# Patient Record
Sex: FEMALE | Race: WHITE | NOT HISPANIC OR LATINO | URBAN - METROPOLITAN AREA
[De-identification: names, ages, dates, MRNs, and addresses within clinical notes are randomized per-mention and may not be internally consistent; named-entity substitution may affect disease eponyms.]

---

## 2024-10-21 ENCOUNTER — OFFICE VISIT (OUTPATIENT)
Dept: LAB | Facility: CLINIC | Age: 29
End: 2024-10-21
Payer: COMMERCIAL

## 2024-10-21 DIAGNOSIS — H04.121 DRY EYE SYNDROME OF RIGHT LACRIMAL GLAND: ICD-10-CM

## 2024-10-21 DIAGNOSIS — R00.2 PALPITATIONS: ICD-10-CM

## 2024-10-21 PROCEDURE — 93005 ELECTROCARDIOGRAM TRACING: CPT

## 2024-10-22 LAB
ATRIAL RATE: 77 BPM
P AXIS: 80 DEGREES
PR INTERVAL: 132 MS
QRS AXIS: 78 DEGREES
QRSD INTERVAL: 90 MS
QT INTERVAL: 376 MS
QTC INTERVAL: 425 MS
T WAVE AXIS: 14 DEGREES
VENTRICULAR RATE: 77 BPM

## 2024-10-22 PROCEDURE — 93010 ELECTROCARDIOGRAM REPORT: CPT | Performed by: INTERNAL MEDICINE

## 2024-11-04 ENCOUNTER — OFFICE VISIT (OUTPATIENT)
Dept: CARDIOLOGY CLINIC | Facility: CLINIC | Age: 29
End: 2024-11-04
Payer: COMMERCIAL

## 2024-11-04 VITALS
DIASTOLIC BLOOD PRESSURE: 62 MMHG | SYSTOLIC BLOOD PRESSURE: 102 MMHG | WEIGHT: 127.2 LBS | BODY MASS INDEX: 20.44 KG/M2 | HEIGHT: 66 IN | HEART RATE: 78 BPM

## 2024-11-04 DIAGNOSIS — R07.9 CHEST PAIN, UNSPECIFIED TYPE: Primary | ICD-10-CM

## 2024-11-04 DIAGNOSIS — Z82.49 FAMILY HISTORY OF CARDIAC DISORDER: ICD-10-CM

## 2024-11-04 PROBLEM — R00.2 PALPITATION: Status: ACTIVE | Noted: 2024-11-04

## 2024-11-04 PROCEDURE — 99203 OFFICE O/P NEW LOW 30 MIN: CPT | Performed by: INTERNAL MEDICINE

## 2024-11-06 ENCOUNTER — HOSPITAL ENCOUNTER (OUTPATIENT)
Dept: NON INVASIVE DIAGNOSTICS | Age: 29
Discharge: HOME | End: 2024-11-06
Payer: COMMERCIAL

## 2024-11-06 VITALS
WEIGHT: 127 LBS | HEART RATE: 67 BPM | DIASTOLIC BLOOD PRESSURE: 68 MMHG | BODY MASS INDEX: 20.41 KG/M2 | SYSTOLIC BLOOD PRESSURE: 108 MMHG | HEIGHT: 66 IN

## 2024-11-06 DIAGNOSIS — Z82.49 FAMILY HISTORY OF CARDIAC DISORDER: ICD-10-CM

## 2024-11-06 DIAGNOSIS — R07.9 CHEST PAIN, UNSPECIFIED TYPE: ICD-10-CM

## 2024-11-06 LAB
AORTIC ROOT: 3 CM
APICAL FOUR CHAMBER EJECTION FRACTION: 59 %
ASCENDING AORTA: 2.8 CM
BSA FOR ECHO PROCEDURE: 1.65 M2
DOP CALC LVOT AREA: 4.15 CM2
DOP CALC LVOT DIAMETER: 2.3 CM
E WAVE DECELERATION TIME: 171 MS
E/A RATIO: 1.49
FRACTIONAL SHORTENING: 32 (ref 28–44)
INTERVENTRICULAR SEPTUM IN DIASTOLE (PARASTERNAL SHORT AXIS VIEW): 0.6 CM
INTERVENTRICULAR SEPTUM: 0.6 CM (ref 0.6–1.1)
LAAS-AP2: 14.1 CM2
LAAS-AP4: 14.8 CM2
LEFT ATRIUM SIZE: 2.5 CM
LEFT ATRIUM VOLUME (MOD BIPLANE): 37 ML
LEFT ATRIUM VOLUME INDEX (MOD BIPLANE): 22.4 ML/M2
LEFT INTERNAL DIMENSION IN SYSTOLE: 3.2 CM (ref 2.1–4)
LEFT VENTRICULAR INTERNAL DIMENSION IN DIASTOLE: 4.7 CM (ref 3.5–6)
LEFT VENTRICULAR POSTERIOR WALL IN END DIASTOLE: 0.8 CM
LEFT VENTRICULAR STROKE VOLUME: 63 ML
LVSV (TEICH): 63 ML
MV E'TISSUE VEL-SEP: 12 CM/S
MV PEAK A VEL: 0.41 M/S
MV PEAK E VEL: 61 CM/S
MV STENOSIS PRESSURE HALF TIME: 50 MS
MV VALVE AREA P 1/2 METHOD: 4.4
RIGHT ATRIUM AREA SYSTOLE A4C: 13 CM2
RIGHT VENTRICLE ID DIMENSION: 2.9 CM
SL CV LEFT ATRIUM LENGTH A2C: 4.3 CM
SL CV LV EF: 60
SL CV PED ECHO LEFT VENTRICLE DIASTOLIC VOLUME (MOD BIPLANE) 2D: 103 ML
SL CV PED ECHO LEFT VENTRICLE SYSTOLIC VOLUME (MOD BIPLANE) 2D: 39 ML
TR MAX PG: 17 MMHG
TR PEAK VELOCITY: 2.1 M/S
TRICUSPID ANNULAR PLANE SYSTOLIC EXCURSION: 2.2 CM
TRICUSPID VALVE PEAK REGURGITATION VELOCITY: 2.08 M/S

## 2024-11-06 PROCEDURE — 93306 TTE W/DOPPLER COMPLETE: CPT

## 2024-11-06 PROCEDURE — 93306 TTE W/DOPPLER COMPLETE: CPT | Performed by: INTERNAL MEDICINE

## 2024-11-08 ENCOUNTER — HOSPITAL ENCOUNTER (OUTPATIENT)
Dept: NON INVASIVE DIAGNOSTICS | Facility: CLINIC | Age: 29
Discharge: HOME | End: 2024-11-08
Payer: COMMERCIAL

## 2024-11-08 VITALS
OXYGEN SATURATION: 99 % | SYSTOLIC BLOOD PRESSURE: 122 MMHG | DIASTOLIC BLOOD PRESSURE: 82 MMHG | HEART RATE: 90 BPM | WEIGHT: 127 LBS | BODY MASS INDEX: 20.41 KG/M2 | HEIGHT: 66 IN

## 2024-11-08 DIAGNOSIS — R07.9 CHEST PAIN, UNSPECIFIED TYPE: ICD-10-CM

## 2024-11-08 DIAGNOSIS — Z82.49 FAMILY HISTORY OF CARDIAC DISORDER: ICD-10-CM

## 2024-11-08 LAB
CHEST PAIN STATEMENT: NORMAL
MAX DIASTOLIC BP: 70 MMHG
MAX HR PERCENT: 92 %
MAX HR: 179 BPM
MAX PREDICTED HEART RATE: 193 BPM
PROTOCOL NAME: NORMAL
RATE PRESSURE PRODUCT: NORMAL
REASON FOR TERMINATION: NORMAL
SL CV STRESS RECOVERY BP: NORMAL MMHG
SL CV STRESS RECOVERY HR: 111 BPM
SL CV STRESS RECOVERY O2 SAT: 99 %
SL CV STRESS STAGE REACHED: 3
STRESS ANGINA INDEX: 0
STRESS BASELINE BP: NORMAL MMHG
STRESS BASELINE HR: 90 BPM
STRESS O2 SAT REST: 99 %
STRESS PEAK HR: 173 BPM
STRESS POST ESTIMATED WORKLOAD: 10.1 METS
STRESS POST EXERCISE DUR MIN: 9 MIN
STRESS POST EXERCISE DUR MIN: 9 MIN
STRESS POST EXERCISE DUR SEC: 0 SEC
STRESS POST EXERCISE DUR SEC: 0 SEC
STRESS POST O2 SAT PEAK: 99 %
STRESS POST PEAK BP: 154 MMHG
STRESS POST PEAK HR: 179 BPM
STRESS POST PEAK SYSTOLIC BP: 160 MMHG
TARGET HR FORMULA: NORMAL
TEST INDICATION: NORMAL

## 2024-11-08 PROCEDURE — 93018 CV STRESS TEST I&R ONLY: CPT | Performed by: INTERNAL MEDICINE

## 2024-11-08 PROCEDURE — 93017 CV STRESS TEST TRACING ONLY: CPT

## 2024-11-08 PROCEDURE — 93016 CV STRESS TEST SUPVJ ONLY: CPT | Performed by: INTERNAL MEDICINE

## 2024-11-09 NOTE — RESULT ENCOUNTER NOTE
Please call her.  I reviewed her stress test personally and did not find anything of significant concern.  If she continues to have issues there is further testing we can do but at this point her cardiac status looks good.  Echocardiogram also looked good.  She has good heart function and her valves look good.  Thank you.

## 2024-11-11 LAB
CHEST PAIN STATEMENT: NORMAL
MAX DIASTOLIC BP: 70 MMHG
MAX PREDICTED HEART RATE: 193 BPM
PROTOCOL NAME: NORMAL
REASON FOR TERMINATION: NORMAL
STRESS POST EXERCISE DUR MIN: 9 MIN
STRESS POST EXERCISE DUR SEC: 0 SEC
STRESS POST PEAK HR: 179 BPM
STRESS POST PEAK SYSTOLIC BP: 160 MMHG
TARGET HR FORMULA: NORMAL
TEST INDICATION: NORMAL

## 2024-11-12 ENCOUNTER — TELEPHONE (OUTPATIENT)
Dept: CARDIOLOGY CLINIC | Facility: CLINIC | Age: 29
End: 2024-11-12

## 2024-11-12 NOTE — TELEPHONE ENCOUNTER
----- Message from Reginald Landers MD sent at 11/9/2024  8:59 AM EST -----  Please call her.  I reviewed her stress test personally and did not find anything of significant concern.  If she continues to have issues there is further testing we can do but at this point her cardiac status looks good.  Echocardiogram also looked good.  She has good heart function and her valves look good.  Thank you.

## 2024-11-12 NOTE — TELEPHONE ENCOUNTER
Phone Number in the chart is unavailable/ could not leave VM. Will send a [employer] message instead.

## 2025-02-01 ENCOUNTER — HOSPITAL ENCOUNTER (OUTPATIENT)
Dept: RADIOLOGY | Facility: HOSPITAL | Age: 30
Discharge: HOME | End: 2025-02-01
Payer: COMMERCIAL

## 2025-02-01 DIAGNOSIS — R10.9 UNSPECIFIED ABDOMINAL PAIN: ICD-10-CM

## 2025-02-01 PROCEDURE — 76775 US EXAM ABDO BACK WALL LIM: CPT

## 2025-02-21 ENCOUNTER — HOSPITAL ENCOUNTER (OUTPATIENT)
Dept: MAMMOGRAPHY | Facility: CLINIC | Age: 30
Discharge: HOME | End: 2025-02-21
Payer: COMMERCIAL

## 2025-02-21 ENCOUNTER — HOSPITAL ENCOUNTER (OUTPATIENT)
Dept: ULTRASOUND IMAGING | Facility: CLINIC | Age: 30
Discharge: HOME | End: 2025-02-21
Payer: COMMERCIAL

## 2025-02-21 VITALS — BODY MASS INDEX: 20.41 KG/M2 | WEIGHT: 127 LBS | HEIGHT: 66 IN

## 2025-02-21 DIAGNOSIS — N63.22 BREAST LUMP ON LEFT SIDE AT 10 O'CLOCK POSITION: ICD-10-CM

## 2025-02-21 DIAGNOSIS — N63.42 SUBAREOLAR MASS OF LEFT BREAST: ICD-10-CM

## 2025-02-21 DIAGNOSIS — N63.14 BREAST LUMP ON RIGHT SIDE AT 4 O'CLOCK POSITION: ICD-10-CM

## 2025-02-21 DIAGNOSIS — Z00.00 ROUTINE GENERAL MEDICAL EXAMINATION AT A HEALTH CARE FACILITY: ICD-10-CM

## 2025-02-21 PROCEDURE — 77066 DX MAMMO INCL CAD BI: CPT

## 2025-02-21 PROCEDURE — G0279 TOMOSYNTHESIS, MAMMO: HCPCS

## 2025-02-21 PROCEDURE — 76642 ULTRASOUND BREAST LIMITED: CPT
